# Patient Record
Sex: MALE | Race: WHITE | NOT HISPANIC OR LATINO | Employment: UNEMPLOYED | URBAN - METROPOLITAN AREA
[De-identification: names, ages, dates, MRNs, and addresses within clinical notes are randomized per-mention and may not be internally consistent; named-entity substitution may affect disease eponyms.]

---

## 2024-07-26 ENCOUNTER — HOSPITAL ENCOUNTER (EMERGENCY)
Facility: HOSPITAL | Age: 39
Discharge: HOME | End: 2024-07-26
Attending: EMERGENCY MEDICINE
Payer: COMMERCIAL

## 2024-07-26 VITALS
OXYGEN SATURATION: 97 % | DIASTOLIC BLOOD PRESSURE: 79 MMHG | HEART RATE: 78 BPM | RESPIRATION RATE: 20 BRPM | SYSTOLIC BLOOD PRESSURE: 118 MMHG | TEMPERATURE: 97.3 F

## 2024-07-26 DIAGNOSIS — Z76.0 ENCOUNTER FOR MEDICATION REFILL: Primary | ICD-10-CM

## 2024-07-26 PROCEDURE — 99284 EMERGENCY DEPT VISIT MOD MDM: CPT | Performed by: EMERGENCY MEDICINE

## 2024-07-26 PROCEDURE — 99281 EMR DPT VST MAYX REQ PHY/QHP: CPT

## 2024-07-26 RX ORDER — BUPRENORPHINE 2 MG/1
8 TABLET SUBLINGUAL ONCE
Status: COMPLETED | OUTPATIENT
Start: 2024-07-26 | End: 2024-07-26

## 2024-07-26 RX ADMIN — BUPRENORPHINE 8 MG: 2 TABLET SUBLINGUAL at 12:27

## 2024-07-26 NOTE — ED PROVIDER NOTES
History  Chief Complaint   Patient presents with    Medication Refill     Pt reports being sent from [facility] to receive his subutex that he is unable to get there for the past 3 days.      39 y/o male presents to the ED for medication refill. He states that he is suppose to take subutex 8mg tab QD but has been out of it for 3 days. He states that he is currently at [facility] where he has been for 2m and states that they are suppose to give him his medications but have not. He reports that he was sent here for refill. He states that he has had withdrawal symptoms the last 3 days. Denies any other complaints.       History provided by:  Patient  Medication Refill  Medications/supplies requested:  Subutex  Reason for request:  Medications ran out  Patient has complete original prescription information: yes        None       History reviewed. No pertinent past medical history.    History reviewed. No pertinent surgical history.    History reviewed. No pertinent family history.  I have reviewed and agree with the history as documented.    E-Cigarette/Vaping     E-Cigarette/Vaping Substances     Social History     Tobacco Use    Smoking status: Every Day     Types: Cigarettes    Smokeless tobacco: Never   Substance Use Topics    Alcohol use: Not Currently    Drug use: Not Currently       Review of Systems   Constitutional:  Negative for chills and fever.   HENT:  Negative for congestion, ear pain and sore throat.    Eyes:  Negative for pain and visual disturbance.   Respiratory:  Negative for cough, shortness of breath and wheezing.    Cardiovascular:  Negative for chest pain and leg swelling.   Gastrointestinal:  Negative for abdominal pain, diarrhea, nausea and vomiting.   Genitourinary:  Negative for dysuria, frequency, hematuria and urgency.   Musculoskeletal:  Negative for neck pain and neck stiffness.   Skin:  Negative for rash and wound.   Neurological:  Negative for weakness, numbness and headaches.    Psychiatric/Behavioral:  Negative for agitation and confusion.    All other systems reviewed and are negative.      Physical Exam  Physical Exam  Vitals and nursing note reviewed.   Constitutional:       Appearance: He is well-developed.   HENT:      Head: Normocephalic and atraumatic.   Eyes:      Pupils: Pupils are equal, round, and reactive to light.   Cardiovascular:      Rate and Rhythm: Normal rate and regular rhythm.   Pulmonary:      Effort: Pulmonary effort is normal.      Breath sounds: Normal breath sounds.   Abdominal:      General: Bowel sounds are normal.      Palpations: Abdomen is soft.   Musculoskeletal:         General: Normal range of motion.      Cervical back: Normal range of motion and neck supple.   Skin:     General: Skin is warm and dry.   Neurological:      General: No focal deficit present.      Mental Status: He is alert and oriented to person, place, and time.      Comments: No focal deficits         Vital Signs  ED Triage Vitals   Temperature Pulse Respirations Blood Pressure SpO2   07/26/24 0858 07/26/24 0858 07/26/24 0858 07/26/24 0858 07/26/24 0858   (!) 97.3 °F (36.3 °C) 78 20 118/79 97 %      Temp Source Heart Rate Source Patient Position - Orthostatic VS BP Location FiO2 (%)   07/26/24 0858 07/26/24 0858 07/26/24 0858 07/26/24 0858 --   Temporal Monitor Sitting Left arm       Pain Score       07/26/24 1227       Med Not Given for Pain - for MAR use only           Vitals:    07/26/24 0858   BP: 118/79   Pulse: 78   Patient Position - Orthostatic VS: Sitting         Visual Acuity      ED Medications  Medications   buprenorphine (SUBUTEX) 2 mg SL tablet 8 mg (8 mg Sublingual Given 7/26/24 1227)       Diagnostic Studies  Results Reviewed       None                   No orders to display              Procedures  Procedures         ED Course  ED Course as of 07/26/24 1328   Fri Jul 26, 2024   0917 Subutex 8mg 1 tablet    0954 Spoke with Natalia from [facility] - states that she will find out  why he was sent here and call back    1031 Awaiting [facility] call back    1053 Attempted to call [facility], no answer    1108 Awaiting call back, spoke to a different person who took my number and states they will call back    1214 Still no return call back from [facility].  Will give patient a dose of Subutex here and discharge.                                               Medical Decision Making  37 y/o male here for med refill- will contact [facility]      Attempted multiple times to get information from [facility] on why patient has not gotten his subutex there and they have said multiple times that they would call me back. However, no return phone call. Will give patient a dose of his meds here and d/c.     Risk  Prescription drug management.                 Disposition  Final diagnoses:   Encounter for medication refill     Time reflects when diagnosis was documented in both MDM as applicable and the Disposition within this note       Time User Action Codes Description Comment    7/26/2024 12:14 PM Agnieszka Badillo [Z76.0] Encounter for medication refill           ED Disposition       ED Disposition   Discharge    Condition   Stable    Date/Time   Fri Jul 26, 2024  9:16 AM    Comment   Zachary Grissom discharge to home/self care.                   Follow-up Information       Follow up With Specialties Details Why Contact Info Additional Information    [facility] [address] [place] [address] Family Medicine Call in 1 day for follow up within 2-3 days [address] [address] [phone] [facility] [address] [facility], [address], [address] [phone]    [facility] Emergency Department Emergency Medicine Go to  immediately for any new or worsening symptoms [address] [facility] [address] [phone] [facility] Emergency Department, [address]  [place], [place], [address]            There are no discharge medications for this patient.      No discharge procedures on file.    PDMP Review         Value Time User    PDMP Reviewed  Yes 7/26/2024  9:18 AM KAREN Bellamy            ED Provider  Electronically Signed by             Agnieszka Badillo DO  07/26/24 5476